# Patient Record
(demographics unavailable — no encounter records)

---

## 2025-04-03 NOTE — IMAGING
[de-identified] : CSPINE Inspection: No rash or ecchymosis Palpation: No spasm in traps, rhomboids, paracervicals ROM: Full with no pain Strength: 4/5 LT biceps; wrist flexor, ; otherwise 5/5.  Sensation: Sensation present to light touch bilateral C5-T1 distributions Reflexes: +Perez's bilaterally  LSPINE Inspection: No rash or ecchymosis Palpation: No tenderness to palpation or spasm in bilateral thoracic and lumbar paraspinal musculature, no SI joint tenderness to palpation ROM: Full with no pain Strength: 5/5 bilateral hip flexors, knee extensors, ankle dorsiflexors, EHL, ankle plantarflexors Sensation: Sensation present to light touch bilateral L2-S1 distributions Provocative maneuvers: - B/L straight leg raise [Straightening consistent with spasm] : Straightening consistent with spasm [No bony abnormalities] : No bony abnormalities [AP] : anteroposterior [There are no fractures, subluxations or dislocations. No significant abnormalities are seen] : There are no fractures, subluxations or dislocations. No significant abnormalities are seen

## 2025-04-03 NOTE — ASSESSMENT
[FreeTextEntry1] : LUE weakness; + b/l Danni's   C Spine MRI to eval neural impingement PT for both Deferred meds F/up after MRI

## 2025-04-03 NOTE — HISTORY OF PRESENT ILLNESS
[de-identified] : 04/03/2025: KING GREEN is a 25 year y/o F here for evaluation of their lower back. pt states the pain started 6-7 years ago. pt states she is unable to lift her left arm and can't lie on her back. she notes sometimes the pain is sharp pain is intermittent. denies injury. notes intermittent n/t in her legs mainly her left. notes pain increase between [id number]. she notes having scoliosis? Neck pain L sided with pain and N/T down the LUE to the hand.  Also, LBP down the LLE to the foot to the toes. Went to PT in 2023.  No bb dysfunction.   office and director for special needs  All: sulfa and chlorophane  No pmh

## 2025-04-24 NOTE — IMAGING
[Straightening consistent with spasm] : Straightening consistent with spasm [No bony abnormalities] : No bony abnormalities [AP] : anteroposterior [There are no fractures, subluxations or dislocations. No significant abnormalities are seen] : There are no fractures, subluxations or dislocations. No significant abnormalities are seen [de-identified] : CSPINE Inspection: No rash or ecchymosis Palpation: No spasm in traps, rhomboids, paracervicals ROM: Full with no pain Strength: 4/5 LT biceps; wrist flexor, ; otherwise 5/5.  Sensation: Sensation present to light touch bilateral C5-T1 distributions Reflexes: +Perez's bilaterally  LSPINE Palpation: No tenderness to palpation or spasm in bilateral thoracic and lumbar paraspinal musculature, no SI joint tenderness to palpation ROM: Full with no pain Strength: 5/5 bilateral hip flexors, knee extensors, ankle dorsiflexors, EHL, ankle plantarflexors Sensation: Sensation present to light touch bilateral L2-S1 distributions Provocative maneuvers: - B/L straight leg raise

## 2025-04-24 NOTE — HISTORY OF PRESENT ILLNESS
[de-identified] :  4/23/25 [facility] Cervical MRI  - report not available yet in chart.  Ind. review- Straight, no HNPs  =============================================================================================== 04/03/2025: KING GREEN is a 25 year y/o F here for evaluation of their lower back. pt states the pain started 6-7 years ago. pt states she is unable to lift her left arm and can't lie on her back. she notes sometimes the pain is sharp pain is intermittent. denies injury. notes intermittent n/t in her legs mainly her left. notes pain increase between [id number]. she notes having scoliosis? Neck pain L sided with pain and N/T down the LUE to the hand.  Also, LBP down the LLE to the foot to the toes. Went to PT in 2023.  No bb dysfunction.   office and director for special needs  All: sulfa and chlorophane  No [facility]  04/24/2025: patient is here to discuss MRI results. patient states since last visit she is still feeling the same.

## 2025-04-24 NOTE — ASSESSMENT
[FreeTextEntry1] : Straight, no HNPs on MRI Will order EMG and recommended f/u with Neurology Discussed indications for surgery